# Patient Record
Sex: FEMALE | Race: WHITE
[De-identification: names, ages, dates, MRNs, and addresses within clinical notes are randomized per-mention and may not be internally consistent; named-entity substitution may affect disease eponyms.]

---

## 2018-01-05 ENCOUNTER — HOSPITAL ENCOUNTER (OUTPATIENT)
Dept: HOSPITAL 74 - FASU | Age: 43
Discharge: HOME | End: 2018-01-05
Attending: ORTHOPAEDIC SURGERY
Payer: COMMERCIAL

## 2018-01-05 VITALS — DIASTOLIC BLOOD PRESSURE: 69 MMHG | TEMPERATURE: 98.3 F | HEART RATE: 81 BPM | SYSTOLIC BLOOD PRESSURE: 122 MMHG

## 2018-01-05 VITALS — BODY MASS INDEX: 38 KG/M2

## 2018-01-05 DIAGNOSIS — M77.11: Primary | ICD-10-CM

## 2018-01-05 PROCEDURE — 0LN33ZZ RELEASE RIGHT UPPER ARM TENDON, PERCUTANEOUS APPROACH: ICD-10-PCS | Performed by: ORTHOPAEDIC SURGERY

## 2018-01-07 NOTE — OP
DATE OF OPERATION:  01/05/2018

 

SURGEON:  Sandra Piper MD

 

ASSISTANT:  JAKI Butcher

 

PREOPERATIVE DIAGNOSIS:  Right elbow lateral epicondylitis.

 

POSTOPERATIVE DIAGNOSIS:  Right elbow lateral epicondylitis.

 

PROCEDURE:  Right elbow Tenex release of lateral epicondyle, percutaneous release.

 

FINDINGS:  Excessive scar tissue noted on ultrasound and palpable on area of pain.

 

PROCEDURE:  Informed consent was obtained.  The patient was taken to the operating

room, where the right upper extremity was prepped and draped in sterile fashion. 

Tourniquet was placed on the upper arm, but not inflated.  Ultrasound guidance was

used and the area of maximum pain was marked prior to the start of surgery.

 

An incision was made approximately 3 cm distal to the lateral epicondyle.  Under

ultrasound guidance, the debriding device, including irrigation and needle

debridement, was placed through the incision, allowing for debridement of the lateral

epicondyle.  This was done in a grid-like pattern across the area, removing the

thickened scar tissue identified on ultrasound.  Total Tenex time was 67 seconds. 

The debridement occurred with the Tenex device.

 

 

SANDRA PIPER M.D.

 

[id number]

DD: 01/06/2018 22:16

DT: 01/07/2018 08:39

Job #:  [id number] Patient called the refill line for a refill of:    amphetamine-dextroamphetamine (ADDERALL) 20 MG tablet    Did not specify paper or pharmacy.

## 2018-08-01 ENCOUNTER — HOSPITAL ENCOUNTER (EMERGENCY)
Dept: HOSPITAL 74 - JER | Age: 43
Discharge: HOME | End: 2018-08-01
Payer: COMMERCIAL

## 2018-08-01 VITALS — TEMPERATURE: 98.7 F

## 2018-08-01 VITALS — BODY MASS INDEX: 36.6 KG/M2

## 2018-08-01 VITALS — SYSTOLIC BLOOD PRESSURE: 135 MMHG | DIASTOLIC BLOOD PRESSURE: 68 MMHG | HEART RATE: 78 BPM

## 2018-08-01 DIAGNOSIS — K21.9: ICD-10-CM

## 2018-08-01 DIAGNOSIS — I10: ICD-10-CM

## 2018-08-01 DIAGNOSIS — E66.9: ICD-10-CM

## 2018-08-01 DIAGNOSIS — L05.01: Primary | ICD-10-CM

## 2018-08-01 DIAGNOSIS — J45.909: ICD-10-CM

## 2018-08-01 LAB
ALBUMIN SERPL-MCNC: 3.9 G/DL (ref 3.4–5)
ALP SERPL-CCNC: 69 U/L (ref 45–117)
ALT SERPL-CCNC: 28 U/L (ref 12–78)
ANION GAP SERPL CALC-SCNC: 9 MMOL/L (ref 8–16)
APTT BLD: 28.4 SECONDS (ref 25.2–36.5)
AST SERPL-CCNC: 22 U/L (ref 15–37)
BASOPHILS # BLD: 0.6 % (ref 0–2)
BILIRUB SERPL-MCNC: 0.3 MG/DL (ref 0.2–1)
BUN SERPL-MCNC: 13 MG/DL (ref 7–18)
CALCIUM SERPL-MCNC: 8.4 MG/DL (ref 8.5–10.1)
CHLORIDE SERPL-SCNC: 107 MMOL/L (ref 98–107)
CO2 SERPL-SCNC: 27 MMOL/L (ref 21–32)
CREAT SERPL-MCNC: 0.7 MG/DL (ref 0.55–1.02)
DEPRECATED RDW RBC AUTO: 14.3 % (ref 11.6–15.6)
EOSINOPHIL # BLD: 2.1 % (ref 0–4.5)
GLUCOSE SERPL-MCNC: 105 MG/DL (ref 74–106)
HCT VFR BLD CALC: 39.6 % (ref 32.4–45.2)
HGB BLD-MCNC: 13.4 GM/DL (ref 10.7–15.3)
INR BLD: 0.98 (ref 0.83–1.09)
LYMPHOCYTES # BLD: 20.8 % (ref 8–40)
MCH RBC QN AUTO: 29.2 PG (ref 25.7–33.7)
MCHC RBC AUTO-ENTMCNC: 33.8 G/DL (ref 32–36)
MCV RBC: 86.6 FL (ref 80–96)
MONOCYTES # BLD AUTO: 7.5 % (ref 3.8–10.2)
NEUTROPHILS # BLD: 69 % (ref 42.8–82.8)
PLATELET # BLD AUTO: 153 K/MM3 (ref 134–434)
PMV BLD: 9.9 FL (ref 7.5–11.1)
POTASSIUM SERPLBLD-SCNC: 3.5 MMOL/L (ref 3.5–5.1)
PROT SERPL-MCNC: 7 G/DL (ref 6.4–8.2)
PT PNL PPP: 11.1 SEC (ref 9.7–13)
RBC # BLD AUTO: 4.57 M/MM3 (ref 3.6–5.2)
SODIUM SERPL-SCNC: 143 MMOL/L (ref 136–145)
WBC # BLD AUTO: 6.9 K/MM3 (ref 4–10)

## 2018-08-01 NOTE — PDOC
History of Present Illness





- General


History Source: Patient


Exam Limitations: No Limitations





- History of Present Illness


Initial Comments: 


08/01/18 08:11


The patient is a 42-year-old female, with no past medical history, who presents 

to the ED with lower back pain. The patient was at the gym yesterday and was 

using a roller to crack her back, but when she rolled over her tailbone she 

felt something pop. Patient checked the area and noted that it was open and 

oozing pus and blood. 


 


The patient denies any fever, chills, nausea, vomit, diarrhea, constipation, or 

abdominal pain.


Denies chest pain, shortness of breath, headache and dizziness.


Denies dysuria, frequency, urgency, hesitancy, and hematuria.





Allergies: NKA


Surgical history: None reported


Social history: None reported


PCP: Dr. Jacquelyn Velez











<Juana Oliveira - Last Filed: 08/01/18 08:23>





<Sandra Avendaño - Last Filed: 08/01/18 15:44>





- General


Chief Complaint: Wound


Stated Complaint: TAILBONE CYST


Time Seen by Provider: 08/01/18 07:48





Past History





<Juana Oliveira - Last Filed: 08/01/18 08:23>





- Past Medical History


Anemia: No


Asthma: Yes (CONTROLLED)


Cancer: No


Cardiac Disorders: No


CVA: No


COPD: No


CHF: No


Dementia: No


Diabetes: No


GI Disorders: Yes (GERD)


 Disorders: No


HTN: Yes


Hypercholesterolemia: No


Liver Disease: No


Seizures: No


Thyroid Disease: No





- Surgical History


Abdominal Surgery: No (SEE BELOW)


Appendectomy: No


Cardiac Surgery: No


Cholecystectomy: Yes (1999)


Lung Surgery: No


Neurologic Surgery: No


Orthopedic Surgery: No





- Suicide/Smoking/Psychosocial Hx


Smoking History: Former smoker


Have you smoked in the past 12 months: No


If you are a former smoker, when did you quit?: 1990S


Information on smoking cessation initiated: No


Hx Alcohol Use: Yes (socially)


Drug/Substance Use Hx: No


Substance Use Type: Alcohol


Hx Substance Use Treatment: No





<Sandra Avendaño - Last Filed: 08/01/18 15:44>





- Past Medical History


Allergies/Adverse Reactions: 


 Allergies











Allergy/AdvReac Type Severity Reaction Status Date / Time


 


Penicillins Allergy Intermediate Hives Verified 08/01/18 07:20











Home Medications: 


Ambulatory Orders





Valsartan [Diovan] 80 mg PO DAILY 03/15/17 


Hydrochlorothiazide 12.5 mg PO DAILY 01/04/18 


Doxycycline Hyclate 100 mg PO BID #14 capsule 08/01/18 











**Review of Systems





- Review of Systems


Able to Perform ROS?: Yes


Comments:: 


08/01/18 08:25


GENERAL/CONSTITUTIONAL: No fever or chills. No weakness.


HEAD, EYES, EARS, NOSE AND THROAT: No change in vision. No ear pain or 

discharge. No sore throat.


CARDIOVASCULAR: No chest pain or shortness of breath.


RESPIRATORY: No cough, wheezing, or hemoptysis.


GASTROINTESTINAL: No nausea, vomiting, diarrhea or constipation.


GENITOURINARY: No dysuria, frequency, or change in urination.


MUSCULOSKELETAL: (+)Lower back pain. No joint swelling or pain. No neck pain.


SKIN: (+)Tailbone cyst. 


NEUROLOGIC: No headache, vertigo, loss of consciousness, or change in strength/

sensation.


ENDOCRINE: No increased thirst. No abnormal weight change.


HEMATOLOGIC/LYMPHATIC: No anemia, easy bleeding, or history of blood clots.


ALLERGIC/IMMUNOLOGIC: No hives or skin allergy.





Is the patient limited English proficient: Yes





<Juana Oliveira - Last Filed: 08/01/18 08:23>





*Physical Exam





- Vital Signs


 Last Vital Signs











Temp Pulse Resp BP Pulse Ox


 


 98.7 F   90   18   146/80   100 


 


 08/01/18 07:21  08/01/18 07:21  08/01/18 07:21  08/01/18 07:21  08/01/18 07:21














- Physical Exam


Comments: 


08/01/18 08:35


GENERAL: (+)Obese. Awake, alert, and fully oriented, in no acute distress


HEAD: No signs of trauma


EYES: PERRLA, EOMI, sclera anicteric, conjunctiva clear


ENT: Auricles normal inspection, hearing grossly normal, nares patent, 

oropharynx clear without exudates. Moist mucosa


NECK: Normal ROM, supple, no lymphadenopathy, JVD, or masses


LUNGS: Breath sounds equal, clear to auscultation bilaterally.  No wheezes, and 

no crackles


HEART: Regular rate and rhythm, normal S1 and S2, no murmurs, rubs or gallops


ABDOMEN: Soft, nontender, normoactive bowel sounds.  No guarding, no rebound.  

No masses


EXTREMITIES: Normal range of motion, no edema.  No clubbing or cyanosis. No 

cords, erythema, or tenderness


NEUROLOGICAL: Cranial nerves II through XII grossly intact.  Normal speech, 

normal gait


SKIN: (+)Indurated area at the tailbone that is open and erythematous, no 

drainage noted at this time. Warm, Dry, normal turgor.











<Juana Oliveira - Last Filed: 08/01/18 08:23>





- Vital Signs


 Last Vital Signs











Temp Pulse Resp BP Pulse Ox


 


 98.7 F   90   18   146/80   100 


 


 08/01/18 07:21  08/01/18 07:21  08/01/18 07:21  08/01/18 07:21  08/01/18 07:21














<Sandra Avendaño - Last Filed: 08/01/18 15:44>





ED Treatment Course





- LABORATORY


CBC & Chemistry Diagram: 


 08/01/18 08:10





 08/01/18 08:10





<Sandra Avendaño - Last Filed: 08/01/18 15:44>





Medical Decision Making





- Medical Decision Making





08/01/18 08:10


a/p: 41yo female with hx of HTN with a pilonidal cyst


-induration and opening to cyst


-no active draining


-mild erythema and warmth


-no fluctuance


-case discussed with Dr. Malave who will be down to eval the cyst


-will send labs, serum preg


-tylenol for pain


-poss I&D of cyst with surgery


08/01/18 10:50


labs reviewed with the zoraida


pt pending surgical eval


08/01/18 11:13


Dr. Malave at the bedside


08/01/18 11:32


pt seen by Dr. Malave - requests IV doxy


will be back to re-eval the patient.


08/01/18 15:25


dr malave re-eval: plan for doxy for 7 days outpt


follow up in 2-3 days with dr. malave in the ED if symptoms worsen, warm 

compresses, if symptoms improve can follow up outpt in the next 30 days for 

elective pilonidal cyst removal





<Sandra Avendaño - Last Filed: 08/01/18 15:44>





*DC/Admit/Observation/Transfer





- Attestations


Scribe Attestion: 


08/01/18 08:43





Documentation prepared by Juana Oliveira, acting as medical scribe for Sandra Avendaño DO.





<Juana Oliveira - Last Filed: 08/01/18 08:23>





- Discharge Dispostion


Decision to Admit order: No





- Attestations


Physician Attestion: 





08/01/18 15:44








I, Dr. Sandra Avendaño DO, attest that this document has been prepared under 

my direction and personally reviewed by me in its entirety.   I further attest, 

that it accurately reflects all work, treatment, procedures and medical decision

-making performed by me.  





<Sandra Avendaño - Last Filed: 08/01/18 15:44>


Diagnosis at time of Disposition: 


 Pilonidal abscess








- Discharge Dispostion


Disposition: HOME


Condition at time of disposition: Stable





- Prescriptions


Prescriptions: 


Doxycycline Hyclate 100 mg PO BID #14 capsule





- Referrals


Referrals: 


Jacquelyn Velez MD [Primary Care Provider] - 


Emery Malave MD [Staff Physician] - 





- Patient Instructions


Printed Discharge Instructions:  Pilonidal Cyst


Additional Instructions: 


Please follow up with Dr. Malave as instructed. Please take all medications as 

prescribed. Please return to the ED with any further concerns or worsening 

complaints. Please use hot compresses to the cyst. 





- Post Discharge Activity


Forms/Work/School Notes:  Back to Work

## 2018-08-01 NOTE — CONSULT
Consult


Consult Specialty:: General Surgery


Referred by:: Sandra Avendaño


Reason for Consultation:: infected pilonidal cyst





- History of Present Illness


Chief Complaint:  cleft pain, swelling, drainage


History of Present Illness: 





41yo obese F with HTN, chronic back pains, began having what she thought was 

lower back pain several days ago, which persisted, and then noticed a small 

lump in the area of the superior  cleft. She thought it might be related 

to something musculoskeletal, as she had been sitting a lot, and has been 

exercising and stretching over the last few days. Last night, she was rolling 

on a piece of equipment and then realized she was all wet in the area. Her 

 confirmed that she had spontaneous drainage from the site, initially 

dark and bloody, then purulent and foul-smelling. It was only then she realized 

it was probably an infection, and came to the ER this morning for evaluation, 

because the site was  and slightly firm. She has not eaten today, 

had no fever or chills, no n/v, no problems with urination or bowel function. 

In the ER, she is afebrile, with normal wbc, and surgery was requested to 

evaluate. Aside from being a little uncomfortable sitting, it is not that 

painful, and stopped draining entirely. She has never had anything like it 

before. 





- History Source


History Provided By: Patient


Limitations to Obtaining History: No Limitations





- Past Medical History


Cardio/Vascular: Yes: HTN


Pulmonary: Yes: Other (reactive airways)


...LMP: 15


...Pregnant: No


Musculoskeletal: Yes: Chronic low back pain, Other (right knee small tear)





- Past Surgical History


Past Surgical History: Yes: Cholecystectomy (laparoscopic )


Additional Surgical History: tenex right elbow surgery, toe surgery





- Alcohol/Substance Use


Hx Alcohol Use: Yes (socially)


History of Substance Use: reports: None





- Smoking History


Smoking history: Former smoker


Have you smoked in the past 12 months: No


If you are a former smoker, when did you quit?: 





- Social History


Usual Living Arrangement: With Spouse


ADL: Independent


Occupation: medical 





Home Medications





- Allergies


Allergies/Adverse Reactions: 


 Allergies











Allergy/AdvReac Type Severity Reaction Status Date / Time


 


Penicillins Allergy Intermediate Hives Verified 18 07:20














- Home Medications


Home Medications: 


Ambulatory Orders





Valsartan [Diovan] 80 mg PO DAILY 03/15/17 


Hydrochlorothiazide 12.5 mg PO DAILY 18 


Doxycycline Hyclate 100 mg PO BID #14 capsule 18 











Family Disease History





- Family Disease History


Family History: Unremarkable (noncontributory)





Review of Systems





- Review of Systems


Constitutional: denies: Chills, Fever


Eyes: reports: Other (wears contacts).  denies: Recent Change in Vision


HENT: denies: Difficult Swallowing, Nasal Congestion, Throat Pain


Neck: denies: Swollen Glands, Tenderness


Cardiovascular: denies: Chest Pain, Palpitations


Respiratory: denies: Cough, SOB


Gastrointestinal: denies: Abdominal Pain, Constipation, Diarrhea, Nausea, 

Vomiting


Genitourinary: denies: Burning, Dysuria


Breasts: reports: No Symptoms Reported


Musculoskeletal: reports: Back Pain, Joint Pain (mild right knee)


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


Neurological: denies: Dizziness, Headache


Psychiatric: denies: Anxiety, Depression





Physical Exam


Vital Signs: 


 Vital Signs











Temperature  98.7 F   18 07:21


 


Pulse Rate  90   18 07:21


 


Respiratory Rate  18   18 07:21


 


Blood Pressure  146/80   18 07:21


 


O2 Sat by Pulse Oximetry (%)  100   18 07:21











Constitutional: Yes: No Distress, Obese


Eyes: Yes: Conjunctiva Clear, EOM Intact


HENT: Yes: Atraumatic, Normocephalic


Neck: Yes: Supple, Trachea Midline


Cardiovascular: Yes: Regular Rate and Rhythm.  No: Murmur


Respiratory: Yes: Regular, CTA Bilaterally


Gastrointestinal: Yes: Soft, Abdomen, Obese.  No: Tenderness


...Rectal Exam: Yes: Deferred, Other (leigh cleft with multiple midline pits, 

most superior is larger, reddened, no active or expressible drainage; some 

local induration, mild tenderness, focal pink/erythema <2cm, no fluctuance)


Renal/: No: CVA Tenderness - Left, CVA Tenderness - Right


Musculoskeletal: No: Joint Stiffness, Joint Swelling


Extremities: No: Cool, Cyanosis


Edema: No


Peripheral Pulses WNL: Yes


Integumentary: No: Jaundice, Rash


Wound/Incision: Yes: Clean/Dry ( cleft), Open to air, Reddened (mild 

erythema focally only).  No: Draining, Bleeding


Neurological: Yes: Alert, Oriented


Psychiatric: Yes: Alert, Oriented


Labs: 


 CBC, BMP





 18 08:10 





 18 08:10 











Problem List





- Problems


(1) Pilonidal abscess


Assessment/Plan: 


spontaneously drained last night


no further drainage expressible or palpable


minimal cellulitis, but induration present with mild tenderness


ER to start Doxycycline with an IV dose


will treat with antibiotics without I&D at this time - Doxy 100mg bid x 7 days


multiple pits raise risk for recurrence


if area swells further or has more drainage, pt to return to ER for I&D


if episode resolves, pt may f/u to discuss definitive excision of pilonidal 

sinuses electively - gave her my card


briefly discussed that definitive excision would involve removing entire sinus 

tracts, and likely result in a deep open wound, which would heal by secondary 

intention with daily packing changes


keep covered prn, pt may use warm compresses prn as well


pt understands and agrees with plan


Code(s): L05.01 - PILONIDAL CYST WITH ABSCESS   





(2) Low back pain


Code(s): M54.5 - LOW BACK PAIN   


Qualifiers: 


   Chronicity: chronic   Back pain laterality: unspecified   Sciatica presence: 

without sciatica   Qualified Code(s): M54.5 - Low back pain; G89.29 - Other 

chronic pain   





(3) Hypertension


Code(s): I10 - ESSENTIAL (PRIMARY) HYPERTENSION   


Qualifiers: 


   Hypertension type: essential hypertension   Qualified Code(s): I10 - 

Essential (primary) hypertension   





(4) Obesity


Code(s): E66.9 - OBESITY, UNSPECIFIED   


Qualifiers: 


   Obesity type: due to excess calories   Obesity classification: adult class 2 

(BMI 35 - 39.9)   Serious obesity comorbidity presence: without serious 

comorbidity   Body mass index: BMI 36.0-36.9   Qualified Code(s): E66.09 - 

Other obesity due to excess calories; Z68.36 - Body mass index (BMI) 36.0-36.9, 

adult

## 2018-12-09 ENCOUNTER — HOSPITAL ENCOUNTER (EMERGENCY)
Dept: HOSPITAL 74 - FER | Age: 43
Discharge: HOME | End: 2018-12-09
Payer: COMMERCIAL

## 2018-12-09 VITALS — SYSTOLIC BLOOD PRESSURE: 150 MMHG | DIASTOLIC BLOOD PRESSURE: 89 MMHG | HEART RATE: 97 BPM | TEMPERATURE: 98.4 F

## 2018-12-09 VITALS — BODY MASS INDEX: 37.3 KG/M2

## 2018-12-09 DIAGNOSIS — Z48.01: Primary | ICD-10-CM

## 2018-12-09 NOTE — PDOC
History of Present Illness





- General


Chief Complaint: Wound


Stated Complaint: WOUND DRAINAGE


Time Seen by Provider: 12/09/18 14:45





- History of Present Illness


Initial Comments: 


Pt presents for evaluation approx 1m s/p pilonidal cyst unroofing for change in 

drainage and concern for purulent drainage. Patient has been changing dressing 

daily (W2D). Denies having drainage like this during her course. Is unable to 

see her surgeon until Tuesday and did not want to see the on-call surgeon today.


Denies fevers/chills, recent illness, HA, vision changes, chest pain, SOB, 

abdominal pain, N/V/C/D


12/09/18 19:52








Past History





- Past Medical History


Allergies/Adverse Reactions: 


 Allergies











Allergy/AdvReac Type Severity Reaction Status Date / Time


 


Penicillins Allergy Intermediate Hives Verified 12/09/18 14:23











Home Medications: 


Ambulatory Orders





Hydrochlorothiazide 12.5 mg PO DAILY 01/04/18 


Olmesartan/Hydrochlorothiazide [Olmesartan-Hctz 20-12.5 mg Tab] 1 each PO DAILY 

12/09/18 








Anemia: No


Asthma: Yes (CONTROLLED)


Cancer: No


Cardiac Disorders: No


CVA: No


COPD: No


CHF: No


Dementia: No


Diabetes: No


GI Disorders: Yes (GERD)


 Disorders: No


HTN: Yes


Hypercholesterolemia: No


Liver Disease: No


Seizures: No


Thyroid Disease: No





- Surgical History


Abdominal Surgery: No (SEE BELOW)


Appendectomy: No


Cardiac Surgery: No


Cholecystectomy: Yes (1999)


Lung Surgery: No


Neurologic Surgery: No


Orthopedic Surgery: No





- Suicide/Smoking/Psychosocial Hx


Smoking History: Former smoker


Have you smoked in the past 12 months: No


If you are a former smoker, when did you quit?: 1990s


Hx Alcohol Use: Yes (socially)


Drug/Substance Use Hx: No


Substance Use Type: Alcohol


Hx Substance Use Treatment: No





**Review of Systems





- Review of Systems


Able to Perform ROS?: Yes


Comments:: 





GENERAL/CONSTITUTIONAL: No fever or chills. No weakness


HEAD, EYES, EARS, NOSE AND THROAT: No change in vision. No ear pain or 

discharge. No sore throat


CARDIOVASCULAR: No chest pain or shortness of breath


RESPIRATORY: Denies cough, hemoptysis


GASTROINTESTINAL: No nausea, vomiting, diarrhea or constipation


GENITOURINARY: No dysuria, frequency, or change in urination


MUSCULOSKELETAL: No joint or muscle swelling or pain. No neck or back pain


SKIN: pilonidal wound


NEUROLOGIC: No headache, vertigo, loss of consciousness, or change in strength/

sensation


ENDOCRINE: No increased thirst. No abnormal weight change


HEMATOLOGIC/LYMPHATIC: No anemia, easy bleeding, or history of blood clots


ALLERGIC/IMMUNOLOGIC: No hives or skin allergy


12/09/18 19:53





Is the patient limited English proficient: No





*Physical Exam





- Vital Signs


Vital Signs











Temp Pulse Resp BP Pulse Ox


 


 98.4 F   97 H  20   150/89   97 


 


 12/09/18 14:23  12/09/18 14:23  12/09/18 14:23  12/09/18 14:23  12/09/18 14:23








12/09/18 19:55








- Physical Exam


Comments: 





GENERAL: Awake, alert, and fully oriented, in no acute distress


HEAD: No signs of trauma, normocephalic, atraumatic 


EYES: PERRLA, EOMI, sclera anicteric, conjunctiva clear


ENT: Hearing grossly normal, nares patent, oropharynx clear without exudates.


EXTREMITIES : Normal inspection, Normal range of motion, no edema. No clubbing 

or cyanosis


NEUROLOGICAL: Cranial nerves II through XII grossly intact. Normal speech, 

normal gait, no focal sensorimotor deficits


SKIN: Pilonidal wound with fibrinous exudate; no surrounding erythema; No tract

; No purulence





12/09/18 19:55








Medical Decision Making





- Medical Decision Making





Pt presents for evaluation approx 1m s/p pilonidal cyst unroofing for change in 

drainage and concern for purulent drainage





Wound check


-Wound w/o evidence of infection or purulent drainage


-Wound dressed W2D





Plan for D/C w/ surgical f/u (pt has appointment on Tuesday)


Discharge instructions and return precautions given


Pt in agreement and verbalized understanding of the plan





Dispo: home


12/09/18 15:36








*DC/Admit/Observation/Transfer


Diagnosis at time of Disposition: 


 Status post surgical removal of pilonidal cyst








- Discharge Dispostion


Disposition: HOME


Condition at time of disposition: Stable


Decision to Admit order: No





- Referrals





- Patient Instructions


Printed Discharge Instructions:  DI for Pilonidal Cyst Drainage and Removal


Additional Instructions: 


You were seen in the Emergency Department for evaluation of a pilonidal wound s/

p unroofing. You wound did not have evidence of surrounding infection or of 

active purulent drainage at this time. Review the handout provided at 

discharge. Maintain follow up with your surgeon this Tuesday. Continue wound 

care. 


Return to the Emergency Department if you develop fevers/chills, worsening 

drainage, purulent drainage, worsening symptoms, or any new/concerning symptoms.





- Post Discharge Activity

## 2018-12-09 NOTE — PDOC
Attending Attestation





- Resident


Resident Name: ElroyLion





- ED Attending Attestation


I have performed the following: I have examined & evaluated the patient, The 

case was reviewed & discussed with the resident, I agree w/resident's findings 

& plan, Exceptions are as noted





- HPI


HPI: 





12/09/18 15:25


Patient had excision of pilonidal cyst in November. The cyst was surgically 

removed and left open to heal. It is been healing well, but yesterday she 

noticed increased drainage and foul odor. No fever/chills, or pain. Initially 

took a course of antibiotics for 1 week, but is on no medication recently. She 

has been packing the wound with gauze, using wet-to-dry dressings. He is not 

diabetic and is otherwise generally healthy.





- Physicial Exam


PE: 





12/09/18 15:27


On examination, she is afebrile, in no acute distress, cheerful and cooperative


The wound is clean and dry. There is no surrounding erythema or induration or 

tenderness. No sinus tract could be identified. There is no fluctuance or other 

evidence of abscess.





- Medical Decision Making





12/09/18 15:28


Assessment: Surgical wound that appears to be healing adequately. No sign of 

infection





Plan: Patient is scheduled to see her surgeon for a postop check on Tuesday. 

Advised to continue present wound care and follow-up as scheduled. Otherwise, 

if there is fever/chills, increased pain, or swelling, to return to the 

emergency room. Fully ambulatory and in no distress upon discharge to follow-up 

as directed

## 2025-03-26 ENCOUNTER — APPOINTMENT (OUTPATIENT)
Dept: INTERNAL MEDICINE | Facility: CLINIC | Age: 50
End: 2025-03-26